# Patient Record
Sex: FEMALE
[De-identification: names, ages, dates, MRNs, and addresses within clinical notes are randomized per-mention and may not be internally consistent; named-entity substitution may affect disease eponyms.]

---

## 2024-03-08 ENCOUNTER — TELEPHONE (OUTPATIENT)
Facility: CLINIC | Age: 24
End: 2024-03-08

## 2024-03-08 NOTE — TELEPHONE ENCOUNTER
Called patient to confirm if insurance is under [employer] [facility] Medicare or Medicaid and she stated Medicaid, but was not sure if it is under the [facility].    Patient will bring card at check-in.      Thank you.

## 2024-03-08 NOTE — TELEPHONE ENCOUNTER
----- Message from Selin Coyne sent at 3/1/2024  3:57 PM EST -----  Subject: Message to Provider    QUESTIONS  Information for Provider? [facility]   [id number]  ---------------------------------------------------------------------------  --------------  CALL BACK INFO  [phone]; OK to leave message on voicemail  ---------------------------------------------------------------------------  --------------  SCRIPT ANSWERS  Relationship to Patient? Covered Entity  Covered Entity Type? Health Insurance?  Representative Name? Katty Silvestre